# Patient Record
Sex: MALE | Race: BLACK OR AFRICAN AMERICAN | NOT HISPANIC OR LATINO | URBAN - NONMETROPOLITAN AREA
[De-identification: names, ages, dates, MRNs, and addresses within clinical notes are randomized per-mention and may not be internally consistent; named-entity substitution may affect disease eponyms.]

---

## 2019-06-04 ENCOUNTER — OFFICE VISIT CONVERTED (OUTPATIENT)
Dept: FAMILY MEDICINE CLINIC | Age: 29
End: 2019-06-04
Attending: NURSE PRACTITIONER

## 2019-06-04 ENCOUNTER — HOSPITAL ENCOUNTER (OUTPATIENT)
Dept: OTHER | Facility: HOSPITAL | Age: 29
Discharge: HOME | End: 2019-06-04
Attending: NURSE PRACTITIONER

## 2019-06-07 LAB — CONV DRAINAGE CULTURE: NORMAL

## 2021-05-18 NOTE — PROGRESS NOTES
Abundio Almaraz 1990     Office/Outpatient Visit    Visit Date: Tue, Jun 4, 2019 02:57 pm    Provider: Omero Alatorre N.P. (Assistant: Sarah Spurling, MA)    Location: [facility]        Electronically signed by Omero Alatorre N.P. on  06/05/2019 12:07:48 PM                             SUBJECTIVE:        CC:     Mr. Almaraz is a 28 year old White male.  Swelling in fingers and toes, no numbness, this started over a month ago.          HPI:         PHQ-9 Depression Screening: Completed form scanned and in chart; Total Score 1 Alcohol Consumption Screening: Completed form scanned and in chart; Total Score 1     Reports pus coming out of the side of his left hand middle finger currently , recently had same thing happen on the other hand and on his right big toe, which he dug out and is not tender but starting to heal. Denies fever     ROS:     CONSTITUTIONAL:  Negative for chills, fatigue, fever, and weight change.      CARDIOVASCULAR:  Negative for chest pain, palpitations, tachycardia, orthopnea, and edema.      RESPIRATORY:  Negative for recent cough, dyspnea and frequent wheezing.      GASTROINTESTINAL:  Negative for abdominal pain, constipation, diarrhea, nausea and vomiting.      INTEGUMENTARY:  Positive for Has had hx of fever blisters in the past and left hand middle finger with pus coming out of the side of his finger.      PSYCHIATRIC:  Negative for anxiety, depression, and sleep disturbances.          PMH/FMH/SH:     Last Reviewed on 6/04/2019 03:22 PM by Omero Alatorre    Past Medical History:             PAST MEDICAL HISTORY     UNREMARKABLE         Surgical History:     NONE         Family History:     Father: Medical history unknown     Mother: Hypertension         Social History:     Occupation: Realtor     Marital Status: Single     Children: None         Tobacco/Alcohol/Supplements:     Last Reviewed on 6/04/2019 03:22 PM by Omero Alatorre    Tobacco: He has  never smoked.          Alcohol: Frequency:    rarely;             Current Problems:     Last Reviewed on 6/04/2019 03:22 PM by Omero Alatorre    Sebaceous cyst     High-risk sexual behavior     Finger swelling         Immunizations:     None        Allergies:     Last Reviewed on 6/04/2019 03:22 PM by Omero Alatorre    Adderall:    Concerta:        Current Medications:     Last Reviewed on 6/04/2019 03:22 PM by Omero Alatorre      No Known Medications.         OBJECTIVE:        Vitals:         Current: 6/4/2019 3:03:18 PM    Ht:  5 ft, 10.5 in;  Wt: 242.6 lbs;  BMI: 34.3    T: 98.8 F;  BP: 133/66 mm Hg (left arm, sitting);  P: 90 bpm (left arm (BP Cuff), sitting)        Exams:     PHYSICAL EXAM:     GENERAL: Vitals recorded well developed, well nourished;  well groomed;  no apparent distress;     RESPIRATORY: normal respiratory rate and pattern with no distress; normal breath sounds with no rales, rhonchi, wheezes or rubs;     CARDIOVASCULAR: normal rate; rhythm is regular;  no systolic murmur; no edema;     GASTROINTESTINAL: nontender, nondistended; no hepatosplenomegaly or masses; no bruits;     SKIN: Infected middle finger /left side of nail bed, exudate sample taken;     NEUROLOGIC: GROSSLY INTACT     PSYCHIATRIC:  appropriate affect and demeanor; normal speech pattern; grossly normal memory;         ASSESSMENT           V79.0   Z13.89  Screening for depression              DDx:     686.9   L08.9  Skin infection of finger              DDx:         ORDERS:         Lab Orders:       [id number]  [facility] Drainage culture  (Send-Out)  (Left hand middle finger )         Other Orders:         Depression screen negative  (In-House)                   PLAN:          Screening for depression Oli vásquez pt, # [id number] neg, dmt     MIPS PHQ-9 Depression Screening Completed form scanned and in chart; Total Score 1           Orders:         Depression screen negative  (In-House)            Skin infection of  finger Will await for culture and treat as indicated, dmt     LABORATORY:  Labs ordered to be performed today include drainage culture.            Orders:       [id number]  [facility] Drainage culture  (Send-Out)  (Left hand middle finger )             CHARGE CAPTURE           **Please note: ICD descriptions below are intended for billing purposes only and may not represent clinical diagnoses**        Primary Diagnosis:         V79.0 Screening for depression            Z13.89    Encounter for screening for other disorder              Orders:          30323   Office/outpatient visit; established patient, level 3  (In-House)                Depression screen negative  (In-House)           686.9 Skin infection of finger            L08.9    Local infection of the skin and subcutaneous tissue, unspecified        ADDENDUMS:      ____________________________________    Date: 06/13/2019 08:39 AM    Author: Drea Gramajo         Visit Note Faxed to:        [facility] (Dermatology); Number [phone]

## 2021-07-01 VITALS
BODY MASS INDEX: 33.96 KG/M2 | DIASTOLIC BLOOD PRESSURE: 66 MMHG | TEMPERATURE: 98.8 F | HEART RATE: 90 BPM | HEIGHT: 71 IN | SYSTOLIC BLOOD PRESSURE: 133 MMHG | WEIGHT: 242.6 LBS

## 2022-01-05 ENCOUNTER — HOSPITAL ENCOUNTER (EMERGENCY)
Facility: HOSPITAL | Age: 32
Discharge: HOME | End: 2022-01-05
Attending: EMERGENCY MEDICINE | Admitting: EMERGENCY MEDICINE

## 2022-01-05 VITALS
TEMPERATURE: 98.2 F | HEIGHT: 72 IN | SYSTOLIC BLOOD PRESSURE: 161 MMHG | RESPIRATION RATE: 12 BRPM | OXYGEN SATURATION: 98 % | WEIGHT: 220 LBS | HEART RATE: 68 BPM | BODY MASS INDEX: 29.8 KG/M2 | DIASTOLIC BLOOD PRESSURE: 93 MMHG

## 2022-01-05 DIAGNOSIS — M54.42 ACUTE MIDLINE LOW BACK PAIN WITH LEFT-SIDED SCIATICA: Primary | ICD-10-CM

## 2022-01-05 PROCEDURE — 99283 EMERGENCY DEPT VISIT LOW MDM: CPT

## 2022-01-05 PROCEDURE — 96372 THER/PROPH/DIAG INJ SC/IM: CPT

## 2022-01-05 PROCEDURE — 25010000002 KETOROLAC TROMETHAMINE PER 15 MG: Performed by: PHYSICIAN ASSISTANT

## 2022-01-05 RX ORDER — KETOROLAC TROMETHAMINE 30 MG/ML
30 INJECTION, SOLUTION INTRAMUSCULAR; INTRAVENOUS EVERY 6 HOURS PRN
Status: DISCONTINUED | OUTPATIENT
Start: 2022-01-05 | End: 2022-01-06 | Stop reason: HOSPADM

## 2022-01-05 RX ORDER — KETOROLAC TROMETHAMINE 30 MG/ML
30 INJECTION, SOLUTION INTRAMUSCULAR; INTRAVENOUS ONCE
Status: DISCONTINUED | OUTPATIENT
Start: 2022-01-05 | End: 2022-01-05

## 2022-01-05 RX ORDER — BACLOFEN 10 MG/1
10 TABLET ORAL ONCE
Status: COMPLETED | OUTPATIENT
Start: 2022-01-05 | End: 2022-01-05

## 2022-01-05 RX ORDER — CYCLOBENZAPRINE HCL 10 MG
10 TABLET ORAL 3 TIMES DAILY
Qty: 20 TABLET | Refills: 0 | Status: SHIPPED | OUTPATIENT
Start: 2022-01-05

## 2022-01-05 RX ORDER — METHYLPREDNISOLONE 4 MG/1
TABLET ORAL
Qty: 1 EACH | Refills: 0 | Status: SHIPPED | OUTPATIENT
Start: 2022-01-05

## 2022-01-05 RX ADMIN — KETOROLAC TROMETHAMINE 30 MG: 30 INJECTION, SOLUTION INTRAMUSCULAR at 22:50

## 2022-01-05 RX ADMIN — BACLOFEN 10 MG: 10 TABLET ORAL at 22:48

## 2022-01-06 NOTE — ED PROVIDER NOTES
22:41 EST      Patient seen and examined with physician assistant.  Briefly patient presents for evaluation of left lower back pain.  Patient is a  and states he was moving a load when he twisted and felt pain to his left lower back.  Pain did not radiate down legs.  Patient has had prior history of back issues.  Patient has no bowel or bladder issues.        I provided a substantive portion of the care of this patient.  I personally performed the physical exam in its entirety for this encounter      Head normocephalic and atraumatic  Neck is supple and nontender  Abdomen soft and nontender  Lungs are clear bilaterally  Heart regular rate and rhythm  Tenderness to his left lower back.  Normal pulses in his legs  Normal mood  Patient is alert and oriented        Plan is symptomatic control      MD ATTESTATION NOTE    The CIARA and I have discussed this patient's history, physical exam, and treatment plan.  I have reviewed the documentation and personally had a face to face interaction with the patient. I affirm the documentation and agree with the treatment and plan.  The attached note describes my personal findings.      Patient was wearing a face mask when I entered the room and they continued to wear a mask throughout their stay in the ED.  I wore PPE, including a gown, gloves, face mask with shield or face mask with goggles whenever I was in the room with patient.      Jose Raul Newman MD  01/05/22 3564       Jose Raul Newman MD  01/05/22 1270

## 2022-01-06 NOTE — ED PROVIDER NOTES
EMERGENCY DEPARTMENT ENCOUNTER    Room Number:  B02/02  Date of encounter:  1/5/2022  PCP: System, Provider Not In  Historian: Patient      HPI:  Chief Complaint: Low back pain  A complete HPI/ROS/PMH/PSH/SH/FH are unobtainable due to: Nothing    Context: Abundio Almaraz is a 31 y.o. male who presents to the ED c/o lower back pain that began earlier today.  Patient states he was on the job as a .  He states he had a fairly light load in his arms and in the process of pivoting pulled his back.  He states there was immediate pain that caused him to drop to his knees.  Since that time he has had trouble ambulating.  The pain is worse with movement of the trunk.  It is better when he remains completely still.  It does radiate to the left hip.  He is here for further evaluation.    He denies fever, chills, remote history of cancer, IVDA.      PAST MEDICAL HISTORY  Active Ambulatory Problems     Diagnosis Date Noted   • No Active Ambulatory Problems     Resolved Ambulatory Problems     Diagnosis Date Noted   • No Resolved Ambulatory Problems     Past Medical History:   Diagnosis Date   • ADHD (attention deficit hyperactivity disorder)          PAST SURGICAL HISTORY  History reviewed. No pertinent surgical history.      FAMILY HISTORY  History reviewed. No pertinent family history.      SOCIAL HISTORY  Social History     Socioeconomic History   • Marital status: Single   Tobacco Use   • Smoking status: Never Smoker   Substance and Sexual Activity   • Alcohol use: No   • Drug use: No         ALLERGIES  Other        REVIEW OF SYSTEMS  Review of Systems   Constitutional: Negative for chills and fever.   HENT: Negative.    Eyes: Negative.    Respiratory: Negative for cough and shortness of breath.    Cardiovascular: Negative for chest pain and palpitations.   Gastrointestinal: Negative for abdominal pain.   Genitourinary: Negative.    Musculoskeletal: Positive for back pain. Negative for neck pain.   Skin:  Negative.    Neurological: Negative for weakness and numbness.        All systems reviewed and negative except for those discussed in HPI.       PHYSICAL EXAM    I have reviewed the triage vital signs and nursing notes.    ED Triage Vitals [01/05/22 2122]   Temp Heart Rate Resp BP SpO2   98.2 °F (36.8 °C) 78 12 161/93 99 %      Temp src Heart Rate Source Patient Position BP Location FiO2 (%)   Tympanic Monitor Standing Right arm --       Physical Exam  GENERAL: Very pleasant, well-nourished, nontoxic.  Does appear slightly uncomfortable  HENT: nares patent  EYES: no scleral icterus  CV: regular rhythm, regular rate  RESPIRATORY: normal effort  ABDOMEN: soft, nontender  MUSCULOSKELETAL: TTP in the lumbar spine region and surrounding soft tissue crease pain with trunk extension, lateral movement.  Strength 5 of 5 globally.  NEURO: alert, moves all extremities, follows commands gross sensation intact globally  SKIN: warm, dry        LAB RESULTS  No results found for this or any previous visit (from the past 24 hour(s)).    Ordered the above labs and independently reviewed the results.        RADIOLOGY  No Radiology Exams Resulted Within Past 24 Hours    I ordered the above noted radiological studies. Reviewed by me and discussed with radiologist.  See dictation for official radiology interpretation.      PROCEDURES    Procedures      MEDICATIONS GIVEN IN ER    Medications   ketorolac (TORADOL) injection 30 mg (30 mg Intramuscular Given 1/5/22 2250)   baclofen (LIORESAL) tablet 10 mg (10 mg Oral Given 1/5/22 2248)         PROGRESS, DATA ANALYSIS, CONSULTS, AND MEDICAL DECISION MAKING    All labs have been independently reviewed by me.  All radiology studies have been reviewed by me and discussed with radiologist dictating the report.   EKG's independently viewed and interpreted by me.  Discussion below represents my analysis of pertinent findings related to patient's condition, differential diagnosis, treatment plan and  final disposition.    DDx includes but is not limited to: Lumbosacral strain, herniated disc, compression fracture.  Doubt compression fracture as it was no trauma.  Discussed x-ray versus attempting to treat first.  Patient chooses to attempt to treat and avoid an x-ray at this visit.  Will order IM Toradol and baclofen in attempt to alleviate the patient's pain.  Will set him up with Workmen's Comp. and a PCP to follow-up with should his symptoms persist beyond 5 days.             PPE: The patient wore a surgical mask throughout the entire patient encounter. I wore an N95.    AS OF 23:26 EST VITALS:    BP - 161/93  HR - 78  TEMP - 98.2 °F (36.8 °C) (Tympanic)  O2 SATS - 99%        DIAGNOSIS  Final diagnoses:   Acute midline low back pain with left-sided sciatica         DISPOSITION  DISCHARGE    Patient discharged in stable condition.    Reviewed implications of results, diagnosis, meds, responsibility to follow up, warning signs and symptoms of possible worsening, potential complications and reasons to return to ER.    Patient/Family voiced understanding of above instructions.    Discussed plan for discharge, as there is no emergent indication for admission. Patient referred to primary care provider for BP management due to today's BP. Pt/family is agreeable and understands need for follow up and repeat testing.  Pt is aware that discharge does not mean that nothing is wrong but it indicates no emergency is present that requires admission and they must continue care with follow-up as given below or physician of their choice.     FOLLOW-UP  [facility] OCCUPATIONAL MEDICINE [facility]  [address] [address] [phone]  Schedule an appointment as soon as possible for a visit   For further evaluation and treatment    PATIENT CONNECTION - [facility] [address] [phone]  Schedule an appointment as soon as possible for a visit   For further evaluation and  treatment, As needed         Medication List      New Prescriptions    cyclobenzaprine 10 MG tablet  Commonly known as: FLEXERIL  Take 1 tablet by mouth 3 (Three) Times a Day.     diclofenac 50 MG EC tablet  Commonly known as: VOLTAREN  Take 1 tablet by mouth 3 (Three) Times a Day.     methylPREDNISolone 4 MG dose pack  Commonly known as: MEDROL  Take as directed on package instructions.           Where to Get Your Medications      You can get these medications from any pharmacy    Bring a paper prescription for each of these medications  · cyclobenzaprine 10 MG tablet  · diclofenac 50 MG EC tablet  · methylPREDNISolone 4 MG dose pack                Dimitri Vásquez III, PA  01/05/22 5499

## 2022-01-06 NOTE — ED TRIAGE NOTES
"Patient was lifting something at work and states, \"My hip just gave out.\" Patient describes that he felt the pain, and then fell to his knees due to the pain. No loss of consciousness.  "